# Patient Record
Sex: FEMALE | Race: BLACK OR AFRICAN AMERICAN | NOT HISPANIC OR LATINO | Employment: UNEMPLOYED | URBAN - METROPOLITAN AREA
[De-identification: names, ages, dates, MRNs, and addresses within clinical notes are randomized per-mention and may not be internally consistent; named-entity substitution may affect disease eponyms.]

---

## 2019-12-30 ENCOUNTER — HOSPITAL ENCOUNTER (EMERGENCY)
Facility: HOSPITAL | Age: 24
Discharge: HOME | End: 2019-12-30
Admitting: EMERGENCY MEDICINE

## 2019-12-30 VITALS
HEART RATE: 73 BPM | DIASTOLIC BLOOD PRESSURE: 63 MMHG | WEIGHT: 123.46 LBS | SYSTOLIC BLOOD PRESSURE: 98 MMHG | OXYGEN SATURATION: 100 % | HEIGHT: 68 IN | RESPIRATION RATE: 16 BRPM | TEMPERATURE: 97.7 F | BODY MASS INDEX: 18.71 KG/M2

## 2019-12-30 DIAGNOSIS — N76.0 ACUTE VAGINITIS: Primary | ICD-10-CM

## 2019-12-30 LAB
B-HCG UR QL: NEGATIVE
BILIRUB UR QL STRIP: NEGATIVE
C TRACH RRNA CVX QL NAA+PROBE: NOT DETECTED
CLARITY UR: CLEAR
CLUE CELLS SPEC QL WET PREP: ABNORMAL
COLOR UR: YELLOW
GLUCOSE UR STRIP-MCNC: NEGATIVE MG/DL
HGB UR QL STRIP.AUTO: NEGATIVE
HYDATID CYST SPEC WET PREP: ABNORMAL
KETONES UR QL STRIP: NEGATIVE
LEUKOCYTE ESTERASE UR QL STRIP.AUTO: NEGATIVE
N GONORRHOEA RRNA SPEC QL NAA+PROBE: NOT DETECTED
NITRITE UR QL STRIP: NEGATIVE
PH UR STRIP.AUTO: 6.5 [PH] (ref 5–8)
PROT UR QL STRIP: NEGATIVE
SP GR UR STRIP: 1.03 (ref 1–1.03)
T VAGINALIS SPEC QL WET PREP: ABNORMAL
UROBILINOGEN UR QL STRIP: NORMAL
WBC SPEC QL WET PREP: ABNORMAL
YEAST GENITAL QL WET PREP: ABNORMAL

## 2019-12-30 PROCEDURE — 81003 URINALYSIS AUTO W/O SCOPE: CPT | Performed by: NURSE PRACTITIONER

## 2019-12-30 PROCEDURE — 87491 CHLMYD TRACH DNA AMP PROBE: CPT | Performed by: NURSE PRACTITIONER

## 2019-12-30 PROCEDURE — 81025 URINE PREGNANCY TEST: CPT | Performed by: NURSE PRACTITIONER

## 2019-12-30 PROCEDURE — 99284 EMERGENCY DEPT VISIT MOD MDM: CPT

## 2019-12-30 PROCEDURE — 25010000002 CEFTRIAXONE PER 250 MG: Performed by: NURSE PRACTITIONER

## 2019-12-30 PROCEDURE — 87210 SMEAR WET MOUNT SALINE/INK: CPT | Performed by: NURSE PRACTITIONER

## 2019-12-30 PROCEDURE — 87591 N.GONORRHOEAE DNA AMP PROB: CPT | Performed by: NURSE PRACTITIONER

## 2019-12-30 PROCEDURE — 96372 THER/PROPH/DIAG INJ SC/IM: CPT

## 2019-12-30 RX ORDER — LIDOCAINE HYDROCHLORIDE 10 MG/ML
0.9 INJECTION, SOLUTION EPIDURAL; INFILTRATION; INTRACAUDAL; PERINEURAL ONCE
Status: COMPLETED | OUTPATIENT
Start: 2019-12-30 | End: 2019-12-30

## 2019-12-30 RX ORDER — CEFTRIAXONE SODIUM 250 MG/1
250 INJECTION, POWDER, FOR SOLUTION INTRAMUSCULAR; INTRAVENOUS ONCE
Status: COMPLETED | OUTPATIENT
Start: 2019-12-30 | End: 2019-12-30

## 2019-12-30 RX ORDER — FLUCONAZOLE 150 MG/1
150 TABLET ORAL ONCE
Qty: 2 TABLET | Refills: 0 | Status: SHIPPED | OUTPATIENT
Start: 2019-12-30 | End: 2019-12-30

## 2019-12-30 RX ORDER — AZITHROMYCIN 250 MG/1
1000 TABLET, FILM COATED ORAL ONCE
Status: COMPLETED | OUTPATIENT
Start: 2019-12-30 | End: 2019-12-30

## 2019-12-30 RX ADMIN — AZITHROMYCIN 1000 MG: 250 TABLET, FILM COATED ORAL at 11:28

## 2019-12-30 RX ADMIN — LIDOCAINE HYDROCHLORIDE 0.9 ML: 10 INJECTION, SOLUTION EPIDURAL; INFILTRATION; INTRACAUDAL; PERINEURAL at 11:30

## 2019-12-30 RX ADMIN — CEFTRIAXONE SODIUM 250 MG: 250 INJECTION, POWDER, FOR SOLUTION INTRAMUSCULAR; INTRAVENOUS at 11:29

## 2023-01-16 ENCOUNTER — APPOINTMENT (OUTPATIENT)
Dept: GENERAL RADIOLOGY | Facility: HOSPITAL | Age: 28
End: 2023-01-16
Payer: COMMERCIAL

## 2023-01-16 ENCOUNTER — HOSPITAL ENCOUNTER (EMERGENCY)
Facility: HOSPITAL | Age: 28
Discharge: HOME | End: 2023-01-16
Attending: EMERGENCY MEDICINE | Admitting: EMERGENCY MEDICINE
Payer: COMMERCIAL

## 2023-01-16 VITALS
WEIGHT: 125 LBS | OXYGEN SATURATION: 98 % | HEIGHT: 68 IN | RESPIRATION RATE: 16 BRPM | TEMPERATURE: 98.9 F | SYSTOLIC BLOOD PRESSURE: 109 MMHG | HEART RATE: 99 BPM | DIASTOLIC BLOOD PRESSURE: 75 MMHG | BODY MASS INDEX: 18.94 KG/M2

## 2023-01-16 DIAGNOSIS — S93.401A SPRAIN OF RIGHT ANKLE, UNSPECIFIED LIGAMENT, INITIAL ENCOUNTER: Primary | ICD-10-CM

## 2023-01-16 DIAGNOSIS — S93.601A SPRAIN OF RIGHT FOOT, INITIAL ENCOUNTER: ICD-10-CM

## 2023-01-16 DIAGNOSIS — S92.101A CLOSED NONDISPLACED FRACTURE OF RIGHT TALUS, UNSPECIFIED PORTION OF TALUS, INITIAL ENCOUNTER: ICD-10-CM

## 2023-01-16 PROCEDURE — 73630 X-RAY EXAM OF FOOT: CPT

## 2023-01-16 PROCEDURE — 99283 EMERGENCY DEPT VISIT LOW MDM: CPT

## 2023-01-16 PROCEDURE — 73610 X-RAY EXAM OF ANKLE: CPT

## 2023-01-16 NOTE — ED PROVIDER NOTES
Subjective    Provider in Triage Note  Right foot hurt post fall this morning  Did not hit head, no loss of consciousness. Pain with ambulation     LMP: Mid-December  10/10 pain  Worse with walking    +:   -: tingling, numbness of LE      History of Present Illness  Chief complaint fall with foot and ankle injury    History of present illness 27-year-old female who slipped on some stairs this morning and rolled her foot and ankle complains of pain to the foot and ankle.  Moderate throbbing nonradiating continuous worse with movement and better with rest all day she has been able to ambulate with some difficulty.  No other complaints or other injury.        Review of Systems   Constitutional: Negative for fever.   Gastrointestinal: Negative.    Musculoskeletal: Positive for arthralgias.   Skin: Negative for rash.   Allergic/Immunologic: Negative for immunocompromised state.   Neurological: Negative.    Hematological: Does not bruise/bleed easily.   Psychiatric/Behavioral: Negative for confusion.       No past medical history on file.    Allergies   Allergen Reactions   • Motrin [Ibuprofen] Swelling       No past surgical history on file.    No family history on file.    Social History     Socioeconomic History   • Marital status: Single   Social history no tobacco alcohol or drugs  Medications none      Objective   Physical Exam  Constitutional 27-year-old female awake alert no distress triage vital signs reviewed.  Examination of the right leg patient has no pain to the knee or proximal tib-fib.  She has some tenderness to the lateral ankle just below lateral malleolus and to the lateral talus.  Squeeze on the calf is downgoing the Achilles tendon is intact dorsiflex plantarflex at difficulty stable to stress without anterior posterior drawer.  Pain with inversion of the foot.  Toes up-and-down including big toe.  It is warm dry good cap refill good dorsalis pedis and posterior talus pulse but is not red or hot or  fevers.  There is tenderness to the anterior lateral lower ankle talus area.  No step-off or deformity.  Distal neurovascular motor is intact and moves it without difficulty.  Procedures           ED Course        XR Ankle 3+ View Right    Result Date: 1/16/2023  Impression: No acute fracture or malalignment. Tiny curvilinear density at the dorsal talonavicular joint likely reflects sequelae of remote injury. Electronically Signed: Hemal Mcmullen  1/16/2023 6:09 PM EST  Workstation ID: [id number]    XR Foot 3+ View Right    Result Date: 1/16/2023  Impression: No acute fracture or malalignment. Tiny curvilinear density at the dorsal talonavicular joint likely reflects sequelae of remote injury. Electronically Signed: Hemal Mcmullen  1/16/2023 6:09 PM EST  Workstation ID: [id number]    Medications - No data to display                                         Medical Decision Making  Decision making.  Patient had x-rays obtained reviewed by me as well as radiology and independently reviewed by me.  They did not see any acute fracture or malalignment there is a tiny curvilinear density at the dorsal talonavicular joint possibly old remote trauma she has had a previous ligamentous injury.  But she is tender here and we talked about this and I will treat this as an acute avulsion fracture with ligamentous injury.  Patient was placed in a long-leg Cam walker and crutches referral to podiatry for definitive management.  Will use over-the-counter Tylenol and ibuprofen for pain a long discussion about what to return for.  To limit weightbearing and increase as tolerated and follow-up with podiatrist as directed.  Stable otherwise unremarkable ER course.    Closed nondisplaced fracture of right talus, unspecified portion of talus, initial encounter: acute illness or injury  Sprain of right ankle, unspecified ligament, initial encounter: acute illness or injury  Sprain of right foot, initial encounter: acute illness or  injury  Amount and/or Complexity of Data Reviewed  Radiology: independent interpretation performed. Decision-making details documented in ED Course.      Risk  OTC drugs.          Final diagnoses:   Sprain of right ankle, unspecified ligament, initial encounter   Sprain of right foot, initial encounter   Closed nondisplaced fracture of right talus, unspecified portion of talus, initial encounter       ED Disposition  ED Disposition     ED Disposition   Discharge    Condition   Stable    Comment   --             PAUL Cole DPM  [address] [place] [facility]  [phone]    In 1 day  Call tomorrow for follow-up within the next 7 to 10 days         Medication List      No changes were made to your prescriptions during this visit.          Zenon Neal MD  01/17/23 0121

## 2023-01-17 NOTE — DISCHARGE INSTRUCTIONS
Elevate ice limit weightbearing follow-up with the podiatrist as listed above.  Follow-up in the next 7 to 10 days.  Tylenol ibuprofen for pain.  Return for any other new or worsening problems or concerns.  You have a small avulsion off the talonavicular joint with ligamentous injury.